# Patient Record
Sex: MALE | Race: BLACK OR AFRICAN AMERICAN | NOT HISPANIC OR LATINO | Employment: OTHER | URBAN - NONMETROPOLITAN AREA
[De-identification: names, ages, dates, MRNs, and addresses within clinical notes are randomized per-mention and may not be internally consistent; named-entity substitution may affect disease eponyms.]

---

## 2018-01-01 ENCOUNTER — APPOINTMENT (OUTPATIENT)
Dept: GENERAL RADIOLOGY | Facility: HOSPITAL | Age: 63
End: 2018-01-01

## 2018-01-01 ENCOUNTER — APPOINTMENT (OUTPATIENT)
Dept: ULTRASOUND IMAGING | Facility: HOSPITAL | Age: 63
End: 2018-01-01

## 2018-01-01 ENCOUNTER — OUTSIDE FACILITY SERVICE (OUTPATIENT)
Dept: PULMONOLOGY | Facility: CLINIC | Age: 63
End: 2018-01-01

## 2018-01-01 ENCOUNTER — APPOINTMENT (OUTPATIENT)
Dept: INTERVENTIONAL RADIOLOGY/VASCULAR | Facility: HOSPITAL | Age: 63
End: 2018-01-01

## 2018-01-01 ENCOUNTER — APPOINTMENT (OUTPATIENT)
Dept: CT IMAGING | Facility: HOSPITAL | Age: 63
End: 2018-01-01

## 2018-01-01 ENCOUNTER — HOSPITAL ENCOUNTER (OUTPATIENT)
Facility: HOSPITAL | Age: 63
End: 2018-01-20
Attending: INTERNAL MEDICINE | Admitting: INTERNAL MEDICINE

## 2018-01-01 VITALS — WEIGHT: 206.8 LBS

## 2018-01-01 LAB
25(OH)D3 SERPL-MCNC: <12.8 NG/ML (ref 30–100)
ABO + RH BLD: NORMAL
ABO + RH BLD: NORMAL
ABO GROUP BLD: NORMAL
ADV 40+41 DNA STL QL NAA+NON-PROBE: NOT DETECTED
ALBUMIN SERPL-MCNC: 2.7 G/DL (ref 3.4–4.8)
ALBUMIN SERPL-MCNC: 2.8 G/DL (ref 3.4–4.8)
ALBUMIN SERPL-MCNC: 2.8 G/DL (ref 3.4–4.8)
ALBUMIN SERPL-MCNC: 2.9 G/DL (ref 3.4–4.8)
ALBUMIN SERPL-MCNC: 3 G/DL (ref 3.4–4.8)
ALBUMIN SERPL-MCNC: 3.1 G/DL (ref 3.4–4.8)
ALBUMIN SERPL-MCNC: 3.2 G/DL (ref 3.4–4.8)
ALBUMIN/GLOB SERPL: 0.7 G/DL (ref 1.1–1.8)
ALBUMIN/GLOB SERPL: 0.8 G/DL (ref 1.1–1.8)
ALP SERPL-CCNC: 122 U/L (ref 38–126)
ALP SERPL-CCNC: 128 U/L (ref 38–126)
ALP SERPL-CCNC: 137 U/L (ref 38–126)
ALP SERPL-CCNC: 141 U/L (ref 38–126)
ALP SERPL-CCNC: 142 U/L (ref 38–126)
ALP SERPL-CCNC: 186 U/L (ref 38–126)
ALP SERPL-CCNC: 213 U/L (ref 38–126)
ALP SERPL-CCNC: 227 U/L (ref 38–126)
ALP SERPL-CCNC: 251 U/L (ref 38–126)
ALP SERPL-CCNC: 268 U/L (ref 38–126)
ALP SERPL-CCNC: 283 U/L (ref 38–126)
ALP SERPL-CCNC: 293 U/L (ref 38–126)
ALP SERPL-CCNC: 300 U/L (ref 38–126)
ALP SERPL-CCNC: 310 U/L (ref 38–126)
ALP SERPL-CCNC: 370 U/L (ref 38–126)
ALP SERPL-CCNC: 373 U/L (ref 38–126)
ALP SERPL-CCNC: 376 U/L (ref 38–126)
ALT SERPL W P-5'-P-CCNC: 34 U/L (ref 21–72)
ALT SERPL W P-5'-P-CCNC: 37 U/L (ref 21–72)
ALT SERPL W P-5'-P-CCNC: 40 U/L (ref 21–72)
ALT SERPL W P-5'-P-CCNC: 41 U/L (ref 21–72)
ALT SERPL W P-5'-P-CCNC: 43 U/L (ref 21–72)
ALT SERPL W P-5'-P-CCNC: 44 U/L (ref 21–72)
ALT SERPL W P-5'-P-CCNC: 44 U/L (ref 21–72)
ALT SERPL W P-5'-P-CCNC: 45 U/L (ref 21–72)
ALT SERPL W P-5'-P-CCNC: 48 U/L (ref 21–72)
ALT SERPL W P-5'-P-CCNC: 53 U/L (ref 21–72)
ALT SERPL W P-5'-P-CCNC: 53 U/L (ref 21–72)
ALT SERPL W P-5'-P-CCNC: 58 U/L (ref 21–72)
ALT SERPL W P-5'-P-CCNC: 60 U/L (ref 21–72)
ALT SERPL W P-5'-P-CCNC: 61 U/L (ref 21–72)
ALT SERPL W P-5'-P-CCNC: 62 U/L (ref 21–72)
ALT SERPL W P-5'-P-CCNC: 63 U/L (ref 21–72)
ALT SERPL W P-5'-P-CCNC: 71 U/L (ref 21–72)
ANION GAP SERPL CALCULATED.3IONS-SCNC: 12 MMOL/L (ref 5–15)
ANION GAP SERPL CALCULATED.3IONS-SCNC: 13 MMOL/L (ref 5–15)
ANION GAP SERPL CALCULATED.3IONS-SCNC: 14 MMOL/L (ref 5–15)
ANION GAP SERPL CALCULATED.3IONS-SCNC: 15 MMOL/L (ref 5–15)
ANION GAP SERPL CALCULATED.3IONS-SCNC: 17 MMOL/L (ref 5–15)
ANION GAP SERPL CALCULATED.3IONS-SCNC: 17 MMOL/L (ref 5–15)
ANION GAP SERPL CALCULATED.3IONS-SCNC: 24 MMOL/L (ref 5–15)
ANION GAP SERPL CALCULATED.3IONS-SCNC: 9 MMOL/L (ref 5–15)
ARTERIAL PATENCY WRIST A: ABNORMAL
AST SERPL-CCNC: 100 U/L (ref 17–59)
AST SERPL-CCNC: 25 U/L (ref 17–59)
AST SERPL-CCNC: 31 U/L (ref 17–59)
AST SERPL-CCNC: 35 U/L (ref 17–59)
AST SERPL-CCNC: 36 U/L (ref 17–59)
AST SERPL-CCNC: 38 U/L (ref 17–59)
AST SERPL-CCNC: 39 U/L (ref 17–59)
AST SERPL-CCNC: 43 U/L (ref 17–59)
AST SERPL-CCNC: 47 U/L (ref 17–59)
AST SERPL-CCNC: 48 U/L (ref 17–59)
AST SERPL-CCNC: 49 U/L (ref 17–59)
AST SERPL-CCNC: 57 U/L (ref 17–59)
AST SERPL-CCNC: 60 U/L (ref 17–59)
AST SERPL-CCNC: 61 U/L (ref 17–59)
AST SERPL-CCNC: 64 U/L (ref 17–59)
AST SERPL-CCNC: 74 U/L (ref 17–59)
AST SERPL-CCNC: 98 U/L (ref 17–59)
ASTRO TYP 1-8 RNA STL QL NAA+NON-PROBE: NOT DETECTED
ATMOSPHERIC PRESS: ABNORMAL MMHG
BACTERIA BLD CULT: ABNORMAL
BACTERIA BLD CULT: ABNORMAL
BACTERIA SPEC AEROBE CULT: ABNORMAL
BACTERIA SPEC AEROBE CULT: NORMAL
BACTERIA SPEC RESP CULT: ABNORMAL
BACTERIA SPEC RESP CULT: NORMAL
BACTERIA SPEC RESP CULT: NORMAL
BACTERIA UR QL AUTO: ABNORMAL /HPF
BASE EXCESS BLDA CALC-SCNC: -0.1 MMOL/L (ref -2.4–2.4)
BASE EXCESS BLDA CALC-SCNC: -0.9 MMOL/L (ref -2.4–2.4)
BASE EXCESS BLDA CALC-SCNC: -10.3 MMOL/L (ref -2.4–2.4)
BASE EXCESS BLDA CALC-SCNC: -11.8 MMOL/L (ref -2.4–2.4)
BASE EXCESS BLDA CALC-SCNC: -12.9 MMOL/L (ref -2.4–2.4)
BASE EXCESS BLDA CALC-SCNC: -15.2 MMOL/L (ref -2.4–2.4)
BASE EXCESS BLDA CALC-SCNC: -3.2 MMOL/L (ref -2.4–2.4)
BASE EXCESS BLDA CALC-SCNC: -3.3 MMOL/L (ref -2.4–2.4)
BASE EXCESS BLDA CALC-SCNC: -4.6 MMOL/L (ref -2.4–2.4)
BASE EXCESS BLDA CALC-SCNC: -4.7 MMOL/L (ref -2.4–2.4)
BASE EXCESS BLDA CALC-SCNC: -7.6 MMOL/L (ref -2.4–2.4)
BASE EXCESS BLDA CALC-SCNC: -7.6 MMOL/L (ref -2.4–2.4)
BASE EXCESS BLDA CALC-SCNC: -8.1 MMOL/L (ref -2.4–2.4)
BASE EXCESS BLDA CALC-SCNC: -8.8 MMOL/L (ref -2.4–2.4)
BASE EXCESS BLDA CALC-SCNC: -9 MMOL/L (ref -2.4–2.4)
BASE EXCESS BLDA CALC-SCNC: 0.3 MMOL/L (ref -2.4–2.4)
BASE EXCESS BLDA CALC-SCNC: 0.7 MMOL/L (ref -2.4–2.4)
BASE EXCESS BLDA CALC-SCNC: 0.8 MMOL/L (ref -2.4–2.4)
BASOPHILS # BLD AUTO: 0.06 10*3/MM3 (ref 0–0.2)
BASOPHILS NFR BLD AUTO: 0.4 % (ref 0–2)
BDY SITE: ABNORMAL
BH BB BLOOD EXPIRATION DATE: NORMAL
BH BB BLOOD EXPIRATION DATE: NORMAL
BH BB BLOOD TYPE BARCODE: 7300
BH BB BLOOD TYPE BARCODE: 7300
BH BB DISPENSE STATUS: NORMAL
BH BB DISPENSE STATUS: NORMAL
BH BB PRODUCT CODE: NORMAL
BH BB PRODUCT CODE: NORMAL
BH BB UNIT NUMBER: NORMAL
BH BB UNIT NUMBER: NORMAL
BILIRUB SERPL-MCNC: 0.4 MG/DL (ref 0.2–1.3)
BILIRUB SERPL-MCNC: 0.5 MG/DL (ref 0.2–1.3)
BILIRUB SERPL-MCNC: 0.6 MG/DL (ref 0.2–1.3)
BILIRUB SERPL-MCNC: 0.7 MG/DL (ref 0.2–1.3)
BILIRUB SERPL-MCNC: 0.8 MG/DL (ref 0.2–1.3)
BILIRUB SERPL-MCNC: 0.8 MG/DL (ref 0.2–1.3)
BILIRUB SERPL-MCNC: 0.9 MG/DL (ref 0.2–1.3)
BILIRUB SERPL-MCNC: 1.1 MG/DL (ref 0.2–1.3)
BILIRUB SERPL-MCNC: 1.8 MG/DL (ref 0.2–1.3)
BILIRUB UR QL STRIP: NEGATIVE
BLD GP AB SCN SERPL QL: NEGATIVE
BUN BLD-MCNC: 55 MG/DL (ref 7–21)
BUN BLD-MCNC: 58 MG/DL (ref 7–21)
BUN BLD-MCNC: 60 MG/DL (ref 7–21)
BUN BLD-MCNC: 62 MG/DL (ref 7–21)
BUN BLD-MCNC: 63 MG/DL (ref 7–21)
BUN BLD-MCNC: 66 MG/DL (ref 7–21)
BUN BLD-MCNC: 69 MG/DL (ref 7–21)
BUN BLD-MCNC: 71 MG/DL (ref 7–21)
BUN BLD-MCNC: 73 MG/DL (ref 7–21)
BUN BLD-MCNC: 73 MG/DL (ref 7–21)
BUN BLD-MCNC: 77 MG/DL (ref 7–21)
BUN BLD-MCNC: 79 MG/DL (ref 7–21)
BUN BLD-MCNC: 84 MG/DL (ref 7–21)
BUN BLD-MCNC: 85 MG/DL (ref 7–21)
BUN BLD-MCNC: 88 MG/DL (ref 7–21)
BUN BLD-MCNC: 99 MG/DL (ref 7–21)
BUN/CREAT SERPL: 16.1 (ref 7–25)
BUN/CREAT SERPL: 16.4 (ref 7–25)
BUN/CREAT SERPL: 17.3 (ref 7–25)
BUN/CREAT SERPL: 17.6 (ref 7–25)
BUN/CREAT SERPL: 18.9 (ref 7–25)
BUN/CREAT SERPL: 19.2 (ref 7–25)
BUN/CREAT SERPL: 19.9 (ref 7–25)
BUN/CREAT SERPL: 19.9 (ref 7–25)
BUN/CREAT SERPL: 20.1 (ref 7–25)
BUN/CREAT SERPL: 20.3 (ref 7–25)
BUN/CREAT SERPL: 21.9 (ref 7–25)
BUN/CREAT SERPL: 22.2 (ref 7–25)
BUN/CREAT SERPL: 22.3 (ref 7–25)
BUN/CREAT SERPL: 22.7 (ref 7–25)
BUN/CREAT SERPL: 23 (ref 7–25)
BUN/CREAT SERPL: 23.3 (ref 7–25)
BUN/CREAT SERPL: 23.8 (ref 7–25)
BUN/CREAT SERPL: 25.2 (ref 7–25)
C CAYETANENSIS DNA STL QL NAA+NON-PROBE: NOT DETECTED
C DIFF TOX GENS STL QL NAA+PROBE: NEGATIVE
C DIFF TOX GENS STL QL NAA+PROBE: NORMAL
CA-I BLD-MCNC: 4.3 MG/DL (ref 4.5–4.9)
CA-I BLD-MCNC: 4.4 MG/DL (ref 4.5–4.9)
CA-I BLD-MCNC: 4.4 MG/DL (ref 4.5–4.9)
CA-I BLD-MCNC: 4.5 MG/DL (ref 4.5–4.9)
CA-I BLD-MCNC: 4.6 MG/DL (ref 4.5–4.9)
CA-I BLD-MCNC: 4.6 MG/DL (ref 4.5–4.9)
CA-I BLD-MCNC: 4.7 MG/DL (ref 4.5–4.9)
CA-I BLD-MCNC: 4.8 MG/DL (ref 4.5–4.9)
CA-I BLD-MCNC: 4.9 MG/DL (ref 4.5–4.9)
CA-I BLD-MCNC: 4.9 MG/DL (ref 4.5–4.9)
CA-I BLD-MCNC: 5 MG/DL (ref 4.5–4.9)
CALCIUM SPEC-SCNC: 8 MG/DL (ref 8.4–10.2)
CALCIUM SPEC-SCNC: 8.1 MG/DL (ref 8.4–10.2)
CALCIUM SPEC-SCNC: 8.2 MG/DL (ref 8.4–10.2)
CALCIUM SPEC-SCNC: 8.3 MG/DL (ref 8.4–10.2)
CALCIUM SPEC-SCNC: 8.4 MG/DL (ref 8.4–10.2)
CALCIUM SPEC-SCNC: 8.5 MG/DL (ref 8.4–10.2)
CALCIUM SPEC-SCNC: 8.6 MG/DL (ref 8.4–10.2)
CALCIUM SPEC-SCNC: 8.6 MG/DL (ref 8.4–10.2)
CALCIUM SPEC-SCNC: 8.9 MG/DL (ref 8.4–10.2)
CALCIUM SPEC-SCNC: 9 MG/DL (ref 8.4–10.2)
CALCIUM SPEC-SCNC: 9 MG/DL (ref 8.4–10.2)
CAMPY SP DNA.DIARRHEA STL QL NAA+PROBE: NOT DETECTED
CHLORIDE SERPL-SCNC: 103 MMOL/L (ref 95–110)
CHLORIDE SERPL-SCNC: 105 MMOL/L (ref 95–110)
CHLORIDE SERPL-SCNC: 105 MMOL/L (ref 95–110)
CHLORIDE SERPL-SCNC: 106 MMOL/L (ref 95–110)
CHLORIDE SERPL-SCNC: 107 MMOL/L (ref 95–110)
CHLORIDE SERPL-SCNC: 108 MMOL/L (ref 95–110)
CHLORIDE SERPL-SCNC: 108 MMOL/L (ref 95–110)
CHLORIDE SERPL-SCNC: 109 MMOL/L (ref 95–110)
CHLORIDE SERPL-SCNC: 110 MMOL/L (ref 95–110)
CHLORIDE SERPL-SCNC: 111 MMOL/L (ref 95–110)
CHLORIDE SERPL-SCNC: 112 MMOL/L (ref 95–110)
CHLORIDE SERPL-SCNC: 112 MMOL/L (ref 95–110)
CHLORIDE SERPL-SCNC: 116 MMOL/L (ref 95–110)
CHLORIDE SERPL-SCNC: 117 MMOL/L (ref 95–110)
CHLORIDE SERPL-SCNC: 119 MMOL/L (ref 95–110)
CHLORIDE SERPL-SCNC: 119 MMOL/L (ref 95–110)
CLARITY UR: ABNORMAL
CO2 BLDA-SCNC: 11.3 MMOL/L (ref 23–27)
CO2 BLDA-SCNC: 11.9 MMOL/L (ref 23–27)
CO2 BLDA-SCNC: 13.4 MMOL/L (ref 23–27)
CO2 BLDA-SCNC: 14.6 MMOL/L (ref 23–27)
CO2 BLDA-SCNC: 15.6 MMOL/L (ref 23–27)
CO2 BLDA-SCNC: 16 MMOL/L (ref 23–27)
CO2 BLDA-SCNC: 16.2 MMOL/L (ref 23–27)
CO2 BLDA-SCNC: 17.2 MMOL/L (ref 23–27)
CO2 BLDA-SCNC: 17.5 MMOL/L (ref 23–27)
CO2 BLDA-SCNC: 19.3 MMOL/L (ref 23–27)
CO2 BLDA-SCNC: 19.3 MMOL/L (ref 23–27)
CO2 BLDA-SCNC: 20.9 MMOL/L (ref 23–27)
CO2 BLDA-SCNC: 21.7 MMOL/L (ref 23–27)
CO2 BLDA-SCNC: 23.8 MMOL/L (ref 23–27)
CO2 BLDA-SCNC: 24.2 MMOL/L (ref 23–27)
CO2 BLDA-SCNC: 24.5 MMOL/L (ref 23–27)
CO2 BLDA-SCNC: 24.6 MMOL/L (ref 23–27)
CO2 BLDA-SCNC: 24.8 MMOL/L (ref 23–27)
CO2 SERPL-SCNC: 11 MMOL/L (ref 22–31)
CO2 SERPL-SCNC: 12 MMOL/L (ref 22–31)
CO2 SERPL-SCNC: 14 MMOL/L (ref 22–31)
CO2 SERPL-SCNC: 16 MMOL/L (ref 22–31)
CO2 SERPL-SCNC: 17 MMOL/L (ref 22–31)
CO2 SERPL-SCNC: 18 MMOL/L (ref 22–31)
CO2 SERPL-SCNC: 18 MMOL/L (ref 22–31)
CO2 SERPL-SCNC: 20 MMOL/L (ref 22–31)
CO2 SERPL-SCNC: 21 MMOL/L (ref 22–31)
CO2 SERPL-SCNC: 22 MMOL/L (ref 22–31)
CO2 SERPL-SCNC: 23 MMOL/L (ref 22–31)
CO2 SERPL-SCNC: 23 MMOL/L (ref 22–31)
CO2 SERPL-SCNC: 24 MMOL/L (ref 22–31)
CO2 SERPL-SCNC: 25 MMOL/L (ref 22–31)
COLOR UR: YELLOW
CREAT BLD-MCNC: 3.07 MG/DL (ref 0.7–1.3)
CREAT BLD-MCNC: 3.09 MG/DL (ref 0.7–1.3)
CREAT BLD-MCNC: 3.1 MG/DL (ref 0.7–1.3)
CREAT BLD-MCNC: 3.13 MG/DL (ref 0.7–1.3)
CREAT BLD-MCNC: 3.14 MG/DL (ref 0.7–1.3)
CREAT BLD-MCNC: 3.18 MG/DL (ref 0.7–1.3)
CREAT BLD-MCNC: 3.2 MG/DL (ref 0.7–1.3)
CREAT BLD-MCNC: 3.21 MG/DL (ref 0.7–1.3)
CREAT BLD-MCNC: 3.29 MG/DL (ref 0.7–1.3)
CREAT BLD-MCNC: 3.32 MG/DL (ref 0.7–1.3)
CREAT BLD-MCNC: 3.35 MG/DL (ref 0.7–1.3)
CREAT BLD-MCNC: 3.41 MG/DL (ref 0.7–1.3)
CREAT BLD-MCNC: 3.45 MG/DL (ref 0.7–1.3)
CREAT BLD-MCNC: 3.47 MG/DL (ref 0.7–1.3)
CREAT BLD-MCNC: 3.6 MG/DL (ref 0.7–1.3)
CREAT BLD-MCNC: 3.7 MG/DL (ref 0.7–1.3)
CREAT BLD-MCNC: 4.53 MG/DL (ref 0.7–1.3)
CREAT BLD-MCNC: 4.65 MG/DL (ref 0.7–1.3)
CRYPTOSP STL CULT: NOT DETECTED
D-LACTATE SERPL-SCNC: 0.9 MMOL/L (ref 0.5–2)
DEPRECATED RDW RBC AUTO: 45.3 FL (ref 35.1–43.9)
DEPRECATED RDW RBC AUTO: 46.1 FL (ref 35.1–43.9)
DEPRECATED RDW RBC AUTO: 46.4 FL (ref 35.1–43.9)
DEPRECATED RDW RBC AUTO: 46.5 FL (ref 35.1–43.9)
DEPRECATED RDW RBC AUTO: 46.6 FL (ref 35.1–43.9)
DEPRECATED RDW RBC AUTO: 47.3 FL (ref 35.1–43.9)
DEPRECATED RDW RBC AUTO: 48 FL (ref 35.1–43.9)
DEPRECATED RDW RBC AUTO: 48.1 FL (ref 35.1–43.9)
DEPRECATED RDW RBC AUTO: 48.6 FL (ref 35.1–43.9)
DEPRECATED RDW RBC AUTO: 49.3 FL (ref 35.1–43.9)
DEPRECATED RDW RBC AUTO: 49.4 FL (ref 35.1–43.9)
DEPRECATED RDW RBC AUTO: 49.4 FL (ref 35.1–43.9)
DEPRECATED RDW RBC AUTO: 49.5 FL (ref 35.1–43.9)
DEPRECATED RDW RBC AUTO: 49.8 FL (ref 35.1–43.9)
DEPRECATED RDW RBC AUTO: 49.8 FL (ref 35.1–43.9)
DEPRECATED RDW RBC AUTO: 50 FL (ref 35.1–43.9)
DEPRECATED RDW RBC AUTO: 50.1 FL (ref 35.1–43.9)
DEPRECATED RDW RBC AUTO: 52.1 FL (ref 35.1–43.9)
E COLI DNA SPEC QL NAA+PROBE: NOT DETECTED
E HISTOLYT AG STL-ACNC: NOT DETECTED
EAEC PAA PLAS AGGR+AATA ST NAA+NON-PRB: NOT DETECTED
EC STX1+STX2 GENES STL QL NAA+NON-PROBE: NOT DETECTED
EOSINOPHIL # BLD AUTO: 0.71 10*3/MM3 (ref 0–0.7)
EOSINOPHIL NFR BLD AUTO: 4.4 % (ref 0–7)
EPEC EAE GENE STL QL NAA+NON-PROBE: NOT DETECTED
ERYTHROCYTE [DISTWIDTH] IN BLOOD BY AUTOMATED COUNT: 15.6 % (ref 11.5–14.5)
ERYTHROCYTE [DISTWIDTH] IN BLOOD BY AUTOMATED COUNT: 15.7 % (ref 11.5–14.5)
ERYTHROCYTE [DISTWIDTH] IN BLOOD BY AUTOMATED COUNT: 15.9 % (ref 11.5–14.5)
ERYTHROCYTE [DISTWIDTH] IN BLOOD BY AUTOMATED COUNT: 16.1 % (ref 11.5–14.5)
ERYTHROCYTE [DISTWIDTH] IN BLOOD BY AUTOMATED COUNT: 16.3 % (ref 11.5–14.5)
ERYTHROCYTE [DISTWIDTH] IN BLOOD BY AUTOMATED COUNT: 16.3 % (ref 11.5–14.5)
ERYTHROCYTE [DISTWIDTH] IN BLOOD BY AUTOMATED COUNT: 16.4 % (ref 11.5–14.5)
ERYTHROCYTE [DISTWIDTH] IN BLOOD BY AUTOMATED COUNT: 16.6 % (ref 11.5–14.5)
ERYTHROCYTE [DISTWIDTH] IN BLOOD BY AUTOMATED COUNT: 16.7 % (ref 11.5–14.5)
ERYTHROCYTE [DISTWIDTH] IN BLOOD BY AUTOMATED COUNT: 16.7 % (ref 11.5–14.5)
ERYTHROCYTE [DISTWIDTH] IN BLOOD BY AUTOMATED COUNT: 16.8 % (ref 11.5–14.5)
ERYTHROCYTE [DISTWIDTH] IN BLOOD BY AUTOMATED COUNT: 16.9 % (ref 11.5–14.5)
ERYTHROCYTE [DISTWIDTH] IN BLOOD BY AUTOMATED COUNT: 17 % (ref 11.5–14.5)
ERYTHROCYTE [DISTWIDTH] IN BLOOD BY AUTOMATED COUNT: 17 % (ref 11.5–14.5)
ETEC LTA+ST1A+ST1B TOX ST NAA+NON-PROBE: NOT DETECTED
FERRITIN SERPL-MCNC: 544 NG/ML (ref 17.9–464)
FLUAV AG NPH QL: NEGATIVE
FLUBV AG NPH QL IA: NEGATIVE
G LAMBLIA DNA SPEC QL NAA+PROBE: NOT DETECTED
GFR SERPL CREATININE-BSD FRML MDRD: 16 ML/MIN/1.73 (ref 49–113)
GFR SERPL CREATININE-BSD FRML MDRD: 16 ML/MIN/1.73 (ref 49–113)
GFR SERPL CREATININE-BSD FRML MDRD: 20 ML/MIN/1.73 (ref 49–113)
GFR SERPL CREATININE-BSD FRML MDRD: 21 ML/MIN/1.73 (ref 49–113)
GFR SERPL CREATININE-BSD FRML MDRD: 22 ML/MIN/1.73 (ref 49–113)
GFR SERPL CREATININE-BSD FRML MDRD: 23 ML/MIN/1.73 (ref 49–113)
GFR SERPL CREATININE-BSD FRML MDRD: 24 ML/MIN/1.73 (ref 49–113)
GFR SERPL CREATININE-BSD FRML MDRD: 25 ML/MIN/1.73 (ref 49–113)
GLOBULIN UR ELPH-MCNC: 3.5 GM/DL (ref 2.3–3.5)
GLOBULIN UR ELPH-MCNC: 3.7 GM/DL (ref 2.3–3.5)
GLOBULIN UR ELPH-MCNC: 3.8 GM/DL (ref 2.3–3.5)
GLOBULIN UR ELPH-MCNC: 3.9 GM/DL (ref 2.3–3.5)
GLOBULIN UR ELPH-MCNC: 3.9 GM/DL (ref 2.3–3.5)
GLOBULIN UR ELPH-MCNC: 4 GM/DL (ref 2.3–3.5)
GLOBULIN UR ELPH-MCNC: 4.1 GM/DL (ref 2.3–3.5)
GLOBULIN UR ELPH-MCNC: 4.2 GM/DL (ref 2.3–3.5)
GLOBULIN UR ELPH-MCNC: 4.3 GM/DL (ref 2.3–3.5)
GLOBULIN UR ELPH-MCNC: 4.3 GM/DL (ref 2.3–3.5)
GLOBULIN UR ELPH-MCNC: 4.5 GM/DL (ref 2.3–3.5)
GLUCOSE BLD-MCNC: 113 MG/DL (ref 60–100)
GLUCOSE BLD-MCNC: 116 MG/DL (ref 60–100)
GLUCOSE BLD-MCNC: 119 MG/DL (ref 60–100)
GLUCOSE BLD-MCNC: 121 MG/DL (ref 60–100)
GLUCOSE BLD-MCNC: 129 MG/DL (ref 60–100)
GLUCOSE BLD-MCNC: 130 MG/DL (ref 60–100)
GLUCOSE BLD-MCNC: 137 MG/DL (ref 60–100)
GLUCOSE BLD-MCNC: 140 MG/DL (ref 60–100)
GLUCOSE BLD-MCNC: 144 MG/DL (ref 60–100)
GLUCOSE BLD-MCNC: 148 MG/DL (ref 60–100)
GLUCOSE BLD-MCNC: 156 MG/DL (ref 60–100)
GLUCOSE BLD-MCNC: 156 MG/DL (ref 60–100)
GLUCOSE BLD-MCNC: 159 MG/DL (ref 60–100)
GLUCOSE BLD-MCNC: 174 MG/DL (ref 60–100)
GLUCOSE BLD-MCNC: 175 MG/DL (ref 60–100)
GLUCOSE BLD-MCNC: 177 MG/DL (ref 60–100)
GLUCOSE BLD-MCNC: 181 MG/DL (ref 60–100)
GLUCOSE BLD-MCNC: 211 MG/DL (ref 60–100)
GLUCOSE BLDA-MCNC: 124 MMOL/L
GLUCOSE BLDA-MCNC: 125 MMOL/L
GLUCOSE BLDA-MCNC: 129 MMOL/L
GLUCOSE BLDA-MCNC: 130 MMOL/L
GLUCOSE BLDA-MCNC: 130 MMOL/L
GLUCOSE BLDA-MCNC: 140 MMOL/L
GLUCOSE BLDA-MCNC: 141 MMOL/L
GLUCOSE BLDA-MCNC: 142 MMOL/L
GLUCOSE BLDA-MCNC: 145 MMOL/L
GLUCOSE BLDA-MCNC: 145 MMOL/L
GLUCOSE BLDA-MCNC: 150 MMOL/L
GLUCOSE BLDA-MCNC: 151 MMOL/L
GLUCOSE BLDA-MCNC: 160 MMOL/L
GLUCOSE BLDA-MCNC: 164 MMOL/L
GLUCOSE BLDA-MCNC: 169 MMOL/L
GLUCOSE BLDA-MCNC: 184 MMOL/L
GLUCOSE BLDA-MCNC: 194 MMOL/L
GLUCOSE BLDA-MCNC: 199 MMOL/L
GLUCOSE BLDC GLUCOMTR-MCNC: 108 MG/DL (ref 70–130)
GLUCOSE BLDC GLUCOMTR-MCNC: 118 MG/DL (ref 70–130)
GLUCOSE BLDC GLUCOMTR-MCNC: 119 MG/DL (ref 70–130)
GLUCOSE BLDC GLUCOMTR-MCNC: 123 MG/DL (ref 70–130)
GLUCOSE BLDC GLUCOMTR-MCNC: 124 MG/DL (ref 70–130)
GLUCOSE BLDC GLUCOMTR-MCNC: 124 MG/DL (ref 70–130)
GLUCOSE BLDC GLUCOMTR-MCNC: 130 MG/DL (ref 70–130)
GLUCOSE BLDC GLUCOMTR-MCNC: 131 MG/DL (ref 70–130)
GLUCOSE BLDC GLUCOMTR-MCNC: 131 MG/DL (ref 70–130)
GLUCOSE BLDC GLUCOMTR-MCNC: 132 MG/DL (ref 70–130)
GLUCOSE BLDC GLUCOMTR-MCNC: 135 MG/DL (ref 70–130)
GLUCOSE BLDC GLUCOMTR-MCNC: 136 MG/DL (ref 70–130)
GLUCOSE BLDC GLUCOMTR-MCNC: 137 MG/DL (ref 70–130)
GLUCOSE BLDC GLUCOMTR-MCNC: 137 MG/DL (ref 70–130)
GLUCOSE BLDC GLUCOMTR-MCNC: 141 MG/DL (ref 70–130)
GLUCOSE BLDC GLUCOMTR-MCNC: 142 MG/DL (ref 70–130)
GLUCOSE BLDC GLUCOMTR-MCNC: 143 MG/DL (ref 70–130)
GLUCOSE BLDC GLUCOMTR-MCNC: 145 MG/DL (ref 70–130)
GLUCOSE BLDC GLUCOMTR-MCNC: 146 MG/DL (ref 70–130)
GLUCOSE BLDC GLUCOMTR-MCNC: 147 MG/DL (ref 70–130)
GLUCOSE BLDC GLUCOMTR-MCNC: 148 MG/DL (ref 70–130)
GLUCOSE BLDC GLUCOMTR-MCNC: 148 MG/DL (ref 70–130)
GLUCOSE BLDC GLUCOMTR-MCNC: 150 MG/DL (ref 70–130)
GLUCOSE BLDC GLUCOMTR-MCNC: 150 MG/DL (ref 70–130)
GLUCOSE BLDC GLUCOMTR-MCNC: 155 MG/DL (ref 70–130)
GLUCOSE BLDC GLUCOMTR-MCNC: 156 MG/DL (ref 70–130)
GLUCOSE BLDC GLUCOMTR-MCNC: 157 MG/DL (ref 70–130)
GLUCOSE BLDC GLUCOMTR-MCNC: 159 MG/DL (ref 70–130)
GLUCOSE BLDC GLUCOMTR-MCNC: 161 MG/DL (ref 70–130)
GLUCOSE BLDC GLUCOMTR-MCNC: 164 MG/DL (ref 70–130)
GLUCOSE BLDC GLUCOMTR-MCNC: 167 MG/DL (ref 70–130)
GLUCOSE BLDC GLUCOMTR-MCNC: 171 MG/DL (ref 70–130)
GLUCOSE BLDC GLUCOMTR-MCNC: 172 MG/DL (ref 70–130)
GLUCOSE BLDC GLUCOMTR-MCNC: 176 MG/DL (ref 70–130)
GLUCOSE BLDC GLUCOMTR-MCNC: 180 MG/DL (ref 70–130)
GLUCOSE BLDC GLUCOMTR-MCNC: 184 MG/DL (ref 70–130)
GLUCOSE BLDC GLUCOMTR-MCNC: 188 MG/DL (ref 70–130)
GLUCOSE BLDC GLUCOMTR-MCNC: 190 MG/DL (ref 70–130)
GLUCOSE BLDC GLUCOMTR-MCNC: 193 MG/DL (ref 70–130)
GLUCOSE BLDC GLUCOMTR-MCNC: 195 MG/DL (ref 70–130)
GLUCOSE BLDC GLUCOMTR-MCNC: 196 MG/DL (ref 70–130)
GLUCOSE BLDC GLUCOMTR-MCNC: 197 MG/DL (ref 70–130)
GLUCOSE BLDC GLUCOMTR-MCNC: 198 MG/DL (ref 70–130)
GLUCOSE BLDC GLUCOMTR-MCNC: 199 MG/DL (ref 70–130)
GLUCOSE BLDC GLUCOMTR-MCNC: 201 MG/DL (ref 70–130)
GLUCOSE BLDC GLUCOMTR-MCNC: 202 MG/DL (ref 70–130)
GLUCOSE BLDC GLUCOMTR-MCNC: 209 MG/DL (ref 70–130)
GLUCOSE BLDC GLUCOMTR-MCNC: 212 MG/DL (ref 70–130)
GLUCOSE BLDC GLUCOMTR-MCNC: 223 MG/DL (ref 70–130)
GLUCOSE BLDC GLUCOMTR-MCNC: 227 MG/DL (ref 70–130)
GLUCOSE BLDC GLUCOMTR-MCNC: 228 MG/DL (ref 70–130)
GLUCOSE BLDC GLUCOMTR-MCNC: 244 MG/DL (ref 70–130)
GLUCOSE UR STRIP-MCNC: NEGATIVE MG/DL
GRAM STN SPEC: ABNORMAL
GRAM STN SPEC: NORMAL
HANSEL STAIN: NEGATIVE
HCO3 BLDA-SCNC: 10.6 MMOL/L (ref 22–26)
HCO3 BLDA-SCNC: 11.3 MMOL/L (ref 22–26)
HCO3 BLDA-SCNC: 12.7 MMOL/L (ref 22–26)
HCO3 BLDA-SCNC: 13.8 MMOL/L (ref 22–26)
HCO3 BLDA-SCNC: 14.8 MMOL/L (ref 22–26)
HCO3 BLDA-SCNC: 15.1 MMOL/L (ref 22–26)
HCO3 BLDA-SCNC: 15.5 MMOL/L (ref 22–26)
HCO3 BLDA-SCNC: 16.4 MMOL/L (ref 22–26)
HCO3 BLDA-SCNC: 16.6 MMOL/L (ref 22–26)
HCO3 BLDA-SCNC: 18.4 MMOL/L (ref 22–26)
HCO3 BLDA-SCNC: 18.5 MMOL/L (ref 22–26)
HCO3 BLDA-SCNC: 20 MMOL/L (ref 22–26)
HCO3 BLDA-SCNC: 20.7 MMOL/L (ref 22–26)
HCO3 BLDA-SCNC: 22.9 MMOL/L (ref 22–26)
HCO3 BLDA-SCNC: 23.1 MMOL/L (ref 22–26)
HCO3 BLDA-SCNC: 23.5 MMOL/L (ref 22–26)
HCO3 BLDA-SCNC: 23.6 MMOL/L (ref 22–26)
HCO3 BLDA-SCNC: 23.7 MMOL/L (ref 22–26)
HCT VFR BLD AUTO: 21 % (ref 39–49)
HCT VFR BLD AUTO: 21.2 % (ref 39–49)
HCT VFR BLD AUTO: 21.4 % (ref 39–49)
HCT VFR BLD AUTO: 22.3 % (ref 39–49)
HCT VFR BLD AUTO: 22.5 % (ref 39–49)
HCT VFR BLD AUTO: 22.9 % (ref 39–49)
HCT VFR BLD AUTO: 23.4 % (ref 39–49)
HCT VFR BLD AUTO: 23.9 % (ref 39–49)
HCT VFR BLD AUTO: 24.5 % (ref 39–49)
HCT VFR BLD AUTO: 25.4 % (ref 39–49)
HCT VFR BLD AUTO: 25.4 % (ref 39–49)
HCT VFR BLD AUTO: 25.9 % (ref 39–49)
HCT VFR BLD AUTO: 26.1 % (ref 39–49)
HCT VFR BLD AUTO: 26.7 % (ref 39–49)
HCT VFR BLD AUTO: 27.6 % (ref 39–49)
HCT VFR BLD AUTO: 27.6 % (ref 39–49)
HCT VFR BLD CALC: 11 % (ref 40–54)
HCT VFR BLD CALC: 21 % (ref 40–54)
HCT VFR BLD CALC: 22 % (ref 40–54)
HCT VFR BLD CALC: 22 % (ref 40–54)
HCT VFR BLD CALC: 23 % (ref 40–54)
HCT VFR BLD CALC: 24 % (ref 40–54)
HCT VFR BLD CALC: 24 % (ref 40–54)
HCT VFR BLD CALC: 25 % (ref 40–54)
HCT VFR BLD CALC: 26 % (ref 40–54)
HCT VFR BLD CALC: 26 % (ref 40–54)
HCT VFR BLD CALC: 27 % (ref 40–54)
HCT VFR BLD CALC: 27 % (ref 40–54)
HCT VFR BLD CALC: 28 % (ref 40–54)
HCT VFR BLD CALC: 29 % (ref 40–54)
HCT VFR BLD CALC: 29 % (ref 40–54)
HGB BLD-MCNC: 7 G/DL (ref 13.7–17.3)
HGB BLD-MCNC: 7.2 G/DL (ref 13.7–17.3)
HGB BLD-MCNC: 7.2 G/DL (ref 13.7–17.3)
HGB BLD-MCNC: 7.4 G/DL (ref 13.7–17.3)
HGB BLD-MCNC: 7.7 G/DL (ref 13.7–17.3)
HGB BLD-MCNC: 7.8 G/DL (ref 13.7–17.3)
HGB BLD-MCNC: 7.8 G/DL (ref 13.7–17.3)
HGB BLD-MCNC: 8.1 G/DL (ref 13.7–17.3)
HGB BLD-MCNC: 8.2 G/DL (ref 13.7–17.3)
HGB BLD-MCNC: 8.2 G/DL (ref 13.7–17.3)
HGB BLD-MCNC: 8.3 G/DL (ref 13.7–17.3)
HGB BLD-MCNC: 8.6 G/DL (ref 13.7–17.3)
HGB BLD-MCNC: 8.7 G/DL (ref 13.7–17.3)
HGB BLD-MCNC: 8.7 G/DL (ref 13.7–17.3)
HGB BLD-MCNC: 8.8 G/DL (ref 13.7–17.3)
HGB BLD-MCNC: 9 G/DL (ref 13.7–17.3)
HGB BLD-MCNC: 9.3 G/DL (ref 13.7–17.3)
HGB BLD-MCNC: 9.3 G/DL (ref 13.7–17.3)
HGB BLDA-MCNC: 10 G/DL (ref 14–18)
HGB BLDA-MCNC: 3.6 G/DL (ref 14–18)
HGB BLDA-MCNC: 7 G/DL (ref 14–18)
HGB BLDA-MCNC: 7.4 G/DL (ref 14–18)
HGB BLDA-MCNC: 7.5 G/DL (ref 14–18)
HGB BLDA-MCNC: 7.7 G/DL (ref 14–18)
HGB BLDA-MCNC: 8.2 G/DL (ref 14–18)
HGB BLDA-MCNC: 8.3 G/DL (ref 14–18)
HGB BLDA-MCNC: 8.4 G/DL (ref 14–18)
HGB BLDA-MCNC: 8.4 G/DL (ref 14–18)
HGB BLDA-MCNC: 8.5 G/DL (ref 14–18)
HGB BLDA-MCNC: 8.6 G/DL (ref 14–18)
HGB BLDA-MCNC: 8.9 G/DL (ref 14–18)
HGB BLDA-MCNC: 9 G/DL (ref 14–18)
HGB BLDA-MCNC: 9.3 G/DL (ref 14–18)
HGB BLDA-MCNC: 9.3 G/DL (ref 14–18)
HGB BLDA-MCNC: 9.4 G/DL (ref 14–18)
HGB BLDA-MCNC: 9.9 G/DL (ref 14–18)
HGB UR QL STRIP.AUTO: ABNORMAL
HYALINE CASTS UR QL AUTO: ABNORMAL /LPF
IMM GRANULOCYTES # BLD: 0.31 10*3/MM3 (ref 0–0.02)
IMM GRANULOCYTES NFR BLD: 1.9 % (ref 0–0.5)
IRON 24H UR-MRATE: 36 MCG/DL (ref 49–181)
IRON SATN MFR SERPL: 15 % (ref 20–55)
ISOLATED FROM: ABNORMAL
ISOLATED FROM: ABNORMAL
KETONES UR QL STRIP: NEGATIVE
LACTOFERRIN STL QL LA: POSITIVE
LEUKOCYTE ESTERASE UR QL STRIP.AUTO: ABNORMAL
LYMPHOCYTES # BLD AUTO: 0.93 10*3/MM3 (ref 0.6–4.2)
LYMPHOCYTES NFR BLD AUTO: 5.8 % (ref 10–50)
Lab: NORMAL
MAGNESIUM SERPL-MCNC: 2.1 MG/DL (ref 1.6–2.3)
MAGNESIUM SERPL-MCNC: 2.1 MG/DL (ref 1.6–2.3)
MAGNESIUM SERPL-MCNC: 2.2 MG/DL (ref 1.6–2.3)
MAGNESIUM SERPL-MCNC: 2.2 MG/DL (ref 1.6–2.3)
MAGNESIUM SERPL-MCNC: 2.3 MG/DL (ref 1.6–2.3)
MAGNESIUM SERPL-MCNC: 2.3 MG/DL (ref 1.6–2.3)
MAGNESIUM SERPL-MCNC: 2.4 MG/DL (ref 1.6–2.3)
MAGNESIUM SERPL-MCNC: 2.5 MG/DL (ref 1.6–2.3)
MAGNESIUM SERPL-MCNC: 2.5 MG/DL (ref 1.6–2.3)
MAGNESIUM SERPL-MCNC: 2.6 MG/DL (ref 1.6–2.3)
MAGNESIUM SERPL-MCNC: 2.7 MG/DL (ref 1.6–2.3)
MAGNESIUM SERPL-MCNC: 2.7 MG/DL (ref 1.6–2.3)
MAGNESIUM SERPL-MCNC: 2.8 MG/DL (ref 1.6–2.3)
MCH RBC QN AUTO: 26.9 PG (ref 26.5–34)
MCH RBC QN AUTO: 27.2 PG (ref 26.5–34)
MCH RBC QN AUTO: 27.2 PG (ref 26.5–34)
MCH RBC QN AUTO: 27.3 PG (ref 26.5–34)
MCH RBC QN AUTO: 27.3 PG (ref 26.5–34)
MCH RBC QN AUTO: 27.4 PG (ref 26.5–34)
MCH RBC QN AUTO: 27.5 PG (ref 26.5–34)
MCH RBC QN AUTO: 27.5 PG (ref 26.5–34)
MCH RBC QN AUTO: 27.6 PG (ref 26.5–34)
MCH RBC QN AUTO: 27.7 PG (ref 26.5–34)
MCH RBC QN AUTO: 27.7 PG (ref 26.5–34)
MCH RBC QN AUTO: 28 PG (ref 26.5–34)
MCHC RBC AUTO-ENTMCNC: 32 G/DL (ref 31.5–36.3)
MCHC RBC AUTO-ENTMCNC: 33.1 G/DL (ref 31.5–36.3)
MCHC RBC AUTO-ENTMCNC: 33.3 G/DL (ref 31.5–36.3)
MCHC RBC AUTO-ENTMCNC: 33.5 G/DL (ref 31.5–36.3)
MCHC RBC AUTO-ENTMCNC: 33.7 G/DL (ref 31.5–36.3)
MCHC RBC AUTO-ENTMCNC: 33.9 G/DL (ref 31.5–36.3)
MCHC RBC AUTO-ENTMCNC: 33.9 G/DL (ref 31.5–36.3)
MCHC RBC AUTO-ENTMCNC: 34 G/DL (ref 31.5–36.3)
MCHC RBC AUTO-ENTMCNC: 34 G/DL (ref 31.5–36.3)
MCHC RBC AUTO-ENTMCNC: 34.1 G/DL (ref 31.5–36.3)
MCHC RBC AUTO-ENTMCNC: 34.3 G/DL (ref 31.5–36.3)
MCHC RBC AUTO-ENTMCNC: 34.3 G/DL (ref 31.5–36.3)
MCHC RBC AUTO-ENTMCNC: 34.5 G/DL (ref 31.5–36.3)
MCHC RBC AUTO-ENTMCNC: 34.6 G/DL (ref 31.5–36.3)
MCV RBC AUTO: 79.3 FL (ref 80–98)
MCV RBC AUTO: 79.9 FL (ref 80–98)
MCV RBC AUTO: 79.9 FL (ref 80–98)
MCV RBC AUTO: 80.2 FL (ref 80–98)
MCV RBC AUTO: 81.2 FL (ref 80–98)
MCV RBC AUTO: 81.4 FL (ref 80–98)
MCV RBC AUTO: 81.5 FL (ref 80–98)
MCV RBC AUTO: 81.6 FL (ref 80–98)
MCV RBC AUTO: 81.7 FL (ref 80–98)
MCV RBC AUTO: 81.7 FL (ref 80–98)
MCV RBC AUTO: 81.9 FL (ref 80–98)
MCV RBC AUTO: 81.9 FL (ref 80–98)
MCV RBC AUTO: 82.4 FL (ref 80–98)
MCV RBC AUTO: 82.5 FL (ref 80–98)
MCV RBC AUTO: 82.7 FL (ref 80–98)
MCV RBC AUTO: 84 FL (ref 80–98)
MODALITY: ABNORMAL
MONOCYTES # BLD AUTO: 0.82 10*3/MM3 (ref 0–0.9)
MONOCYTES NFR BLD AUTO: 5.1 % (ref 0–12)
NEUTROPHILS # BLD AUTO: 13.22 10*3/MM3 (ref 2–8.6)
NEUTROPHILS NFR BLD AUTO: 82.4 % (ref 37–80)
NITRITE UR QL STRIP: NEGATIVE
NOROVIRUS GI+II RNA STL QL NAA+NON-PROBE: NOT DETECTED
P SHIGELLOIDES DNA STL QL NAA+NON-PROBE: NOT DETECTED
PCO2 BLDA: 20.9 MM HG (ref 35–45)
PCO2 BLDA: 23.8 MM HG (ref 35–45)
PCO2 BLDA: 24.6 MM HG (ref 35–45)
PCO2 BLDA: 24.8 MM HG (ref 35–45)
PCO2 BLDA: 25.1 MM HG (ref 35–45)
PCO2 BLDA: 25.4 MM HG (ref 35–45)
PCO2 BLDA: 26.9 MM HG (ref 35–45)
PCO2 BLDA: 26.9 MM HG (ref 35–45)
PCO2 BLDA: 27.4 MM HG (ref 35–45)
PCO2 BLDA: 28.2 MM HG (ref 35–45)
PCO2 BLDA: 28.7 MM HG (ref 35–45)
PCO2 BLDA: 29.1 MM HG (ref 35–45)
PCO2 BLDA: 29.2 MM HG (ref 35–45)
PCO2 BLDA: 29.7 MM HG (ref 35–45)
PCO2 BLDA: 31.5 MM HG (ref 35–45)
PCO2 BLDA: 32.8 MM HG (ref 35–45)
PCO2 BLDA: 34.9 MM HG (ref 35–45)
PCO2 BLDA: 35 MM HG (ref 35–45)
PH BLDA: 7.26 PH UNITS (ref 7.35–7.45)
PH BLDA: 7.33 PH UNITS (ref 7.35–7.45)
PH BLDA: 7.35 PH UNITS (ref 7.35–7.45)
PH BLDA: 7.36 PH UNITS (ref 7.35–7.45)
PH BLDA: 7.37 PH UNITS (ref 7.35–7.45)
PH BLDA: 7.38 PH UNITS (ref 7.35–7.45)
PH BLDA: 7.38 PH UNITS (ref 7.35–7.45)
PH BLDA: 7.39 PH UNITS (ref 7.35–7.45)
PH BLDA: 7.4 PH UNITS (ref 7.35–7.45)
PH BLDA: 7.42 PH UNITS (ref 7.35–7.45)
PH BLDA: 7.44 PH UNITS (ref 7.35–7.45)
PH BLDA: 7.45 PH UNITS (ref 7.35–7.45)
PH BLDA: 7.46 PH UNITS (ref 7.35–7.45)
PH BLDA: 7.49 PH UNITS (ref 7.35–7.45)
PH BLDA: 7.51 PH UNITS (ref 7.35–7.45)
PH BLDA: 7.52 PH UNITS (ref 7.35–7.45)
PH UR STRIP.AUTO: <=5 [PH] (ref 5–9)
PLATELET # BLD AUTO: 145 10*3/MM3 (ref 150–450)
PLATELET # BLD AUTO: 155 10*3/MM3 (ref 150–450)
PLATELET # BLD AUTO: 160 10*3/MM3 (ref 150–450)
PLATELET # BLD AUTO: 164 10*3/MM3 (ref 150–450)
PLATELET # BLD AUTO: 164 10*3/MM3 (ref 150–450)
PLATELET # BLD AUTO: 168 10*3/MM3 (ref 150–450)
PLATELET # BLD AUTO: 169 10*3/MM3 (ref 150–450)
PLATELET # BLD AUTO: 176 10*3/MM3 (ref 150–450)
PLATELET # BLD AUTO: 178 10*3/MM3 (ref 150–450)
PLATELET # BLD AUTO: 179 10*3/MM3 (ref 150–450)
PLATELET # BLD AUTO: 181 10*3/MM3 (ref 150–450)
PLATELET # BLD AUTO: 182 10*3/MM3 (ref 150–450)
PLATELET # BLD AUTO: 196 10*3/MM3 (ref 150–450)
PLATELET # BLD AUTO: 200 10*3/MM3 (ref 150–450)
PLATELET # BLD AUTO: 207 10*3/MM3 (ref 150–450)
PLATELET # BLD AUTO: 217 10*3/MM3 (ref 150–450)
PLATELET # BLD AUTO: 223 10*3/MM3 (ref 150–450)
PLATELET # BLD AUTO: 250 10*3/MM3 (ref 150–450)
PMV BLD AUTO: 8.5 FL (ref 8–12)
PMV BLD AUTO: 8.5 FL (ref 8–12)
PMV BLD AUTO: 8.6 FL (ref 8–12)
PMV BLD AUTO: 8.6 FL (ref 8–12)
PMV BLD AUTO: 8.7 FL (ref 8–12)
PMV BLD AUTO: 8.7 FL (ref 8–12)
PMV BLD AUTO: 8.8 FL (ref 8–12)
PMV BLD AUTO: 8.8 FL (ref 8–12)
PMV BLD AUTO: 9 FL (ref 8–12)
PMV BLD AUTO: 9.1 FL (ref 8–12)
PMV BLD AUTO: 9.2 FL (ref 8–12)
PMV BLD AUTO: 9.3 FL (ref 8–12)
PMV BLD AUTO: 9.4 FL (ref 8–12)
PMV BLD AUTO: 9.4 FL (ref 8–12)
PO2 BLDA: 101.4 MM HG (ref 80–105)
PO2 BLDA: 101.7 MM HG (ref 80–105)
PO2 BLDA: 115.2 MM HG (ref 80–105)
PO2 BLDA: 123.5 MM HG (ref 80–105)
PO2 BLDA: 134.5 MM HG (ref 80–105)
PO2 BLDA: 146.8 MM HG (ref 80–105)
PO2 BLDA: 55.3 MM HG (ref 80–105)
PO2 BLDA: 68.1 MM HG (ref 80–105)
PO2 BLDA: 68.9 MM HG (ref 80–105)
PO2 BLDA: 72 MM HG (ref 80–105)
PO2 BLDA: 72.5 MM HG (ref 80–105)
PO2 BLDA: 76.4 MM HG (ref 80–105)
PO2 BLDA: 77.1 MM HG (ref 80–105)
PO2 BLDA: 80.9 MM HG (ref 80–105)
PO2 BLDA: 84.6 MM HG (ref 80–105)
PO2 BLDA: 89.1 MM HG (ref 80–105)
PO2 BLDA: 92.5 MM HG (ref 80–105)
PO2 BLDA: 97.7 MM HG (ref 80–105)
POTASSIUM BLD-SCNC: 3.2 MMOL/L (ref 3.5–5.1)
POTASSIUM BLD-SCNC: 3.3 MMOL/L (ref 3.5–5.1)
POTASSIUM BLD-SCNC: 3.4 MMOL/L (ref 3.5–5.1)
POTASSIUM BLD-SCNC: 3.5 MMOL/L (ref 3.5–5.1)
POTASSIUM BLD-SCNC: 3.6 MMOL/L (ref 3.5–5.1)
POTASSIUM BLD-SCNC: 3.8 MMOL/L (ref 3.5–5.1)
POTASSIUM BLD-SCNC: 3.9 MMOL/L (ref 3.5–5.1)
POTASSIUM BLD-SCNC: 4 MMOL/L (ref 3.5–5.1)
POTASSIUM BLD-SCNC: 4.2 MMOL/L (ref 3.5–5.1)
POTASSIUM BLD-SCNC: 5 MMOL/L (ref 3.5–5.1)
POTASSIUM BLDA-SCNC: 3.14 MMOL/L (ref 3.6–4.9)
POTASSIUM BLDA-SCNC: 3.33 MMOL/L (ref 3.6–4.9)
POTASSIUM BLDA-SCNC: 3.33 MMOL/L (ref 3.6–4.9)
POTASSIUM BLDA-SCNC: 3.48 MMOL/L (ref 3.6–4.9)
POTASSIUM BLDA-SCNC: 3.49 MMOL/L (ref 3.6–4.9)
POTASSIUM BLDA-SCNC: 3.5 MMOL/L (ref 3.6–4.9)
POTASSIUM BLDA-SCNC: 3.53 MMOL/L (ref 3.6–4.9)
POTASSIUM BLDA-SCNC: 3.59 MMOL/L (ref 3.6–4.9)
POTASSIUM BLDA-SCNC: 3.66 MMOL/L (ref 3.6–4.9)
POTASSIUM BLDA-SCNC: 3.76 MMOL/L (ref 3.6–4.9)
POTASSIUM BLDA-SCNC: 3.91 MMOL/L (ref 3.6–4.9)
POTASSIUM BLDA-SCNC: 3.92 MMOL/L (ref 3.6–4.9)
POTASSIUM BLDA-SCNC: 3.92 MMOL/L (ref 3.6–4.9)
POTASSIUM BLDA-SCNC: 3.98 MMOL/L (ref 3.6–4.9)
POTASSIUM BLDA-SCNC: 4.04 MMOL/L (ref 3.6–4.9)
POTASSIUM BLDA-SCNC: 4.06 MMOL/L (ref 3.6–4.9)
POTASSIUM BLDA-SCNC: 4.11 MMOL/L (ref 3.6–4.9)
POTASSIUM BLDA-SCNC: 4.14 MMOL/L (ref 3.6–4.9)
PROT SERPL-MCNC: 6.2 G/DL (ref 6.3–8.6)
PROT SERPL-MCNC: 6.5 G/DL (ref 6.3–8.6)
PROT SERPL-MCNC: 6.7 G/DL (ref 6.3–8.6)
PROT SERPL-MCNC: 6.8 G/DL (ref 6.3–8.6)
PROT SERPL-MCNC: 6.9 G/DL (ref 6.3–8.6)
PROT SERPL-MCNC: 6.9 G/DL (ref 6.3–8.6)
PROT SERPL-MCNC: 7 G/DL (ref 6.3–8.6)
PROT SERPL-MCNC: 7 G/DL (ref 6.3–8.6)
PROT SERPL-MCNC: 7.1 G/DL (ref 6.3–8.6)
PROT SERPL-MCNC: 7.1 G/DL (ref 6.3–8.6)
PROT SERPL-MCNC: 7.3 G/DL (ref 6.3–8.6)
PROT SERPL-MCNC: 7.3 G/DL (ref 6.3–8.6)
PROT SERPL-MCNC: 7.7 G/DL (ref 6.3–8.6)
PROT UR QL STRIP: ABNORMAL
PTH-INTACT SERPL-MCNC: 84.1 PG/ML (ref 10–65)
RBC # BLD AUTO: 2.55 10*6/MM3 (ref 4.37–5.74)
RBC # BLD AUTO: 2.61 10*6/MM3 (ref 4.37–5.74)
RBC # BLD AUTO: 2.68 10*6/MM3 (ref 4.37–5.74)
RBC # BLD AUTO: 2.7 10*6/MM3 (ref 4.37–5.74)
RBC # BLD AUTO: 2.79 10*6/MM3 (ref 4.37–5.74)
RBC # BLD AUTO: 2.82 10*6/MM3 (ref 4.37–5.74)
RBC # BLD AUTO: 2.87 10*6/MM3 (ref 4.37–5.74)
RBC # BLD AUTO: 2.97 10*6/MM3 (ref 4.37–5.74)
RBC # BLD AUTO: 2.98 10*6/MM3 (ref 4.37–5.74)
RBC # BLD AUTO: 3 10*6/MM3 (ref 4.37–5.74)
RBC # BLD AUTO: 3 10*6/MM3 (ref 4.37–5.74)
RBC # BLD AUTO: 3.07 10*6/MM3 (ref 4.37–5.74)
RBC # BLD AUTO: 3.18 10*6/MM3 (ref 4.37–5.74)
RBC # BLD AUTO: 3.19 10*6/MM3 (ref 4.37–5.74)
RBC # BLD AUTO: 3.2 10*6/MM3 (ref 4.37–5.74)
RBC # BLD AUTO: 3.28 10*6/MM3 (ref 4.37–5.74)
RBC # BLD AUTO: 3.37 10*6/MM3 (ref 4.37–5.74)
RBC # BLD AUTO: 3.37 10*6/MM3 (ref 4.37–5.74)
RBC # UR: ABNORMAL /HPF
REF LAB TEST METHOD: ABNORMAL
RH BLD: POSITIVE
RV RNA STL NAA+PROBE: NOT DETECTED
SALMONELLA DNA SPEC QL NAA+PROBE: NOT DETECTED
SAO2 % BLDCOA: 87 % (ref 94–100)
SAO2 % BLDCOA: 92.6 % (ref 94–100)
SAO2 % BLDCOA: 92.7 % (ref 94–100)
SAO2 % BLDCOA: 93.9 % (ref 94–100)
SAO2 % BLDCOA: 94.2 % (ref 94–100)
SAO2 % BLDCOA: 94.9 %
SAO2 % BLDCOA: 95.2 % (ref 94–100)
SAO2 % BLDCOA: 95.4 %
SAO2 % BLDCOA: 96 % (ref 94–100)
SAO2 % BLDCOA: 96.3 % (ref 94–100)
SAO2 % BLDCOA: 96.7 %
SAO2 % BLDCOA: 96.7 % (ref 94–100)
SAO2 % BLDCOA: 97.2 %
SAO2 % BLDCOA: 97.2 % (ref 94–100)
SAO2 % BLDCOA: 97.4 %
SAO2 % BLDCOA: 98.1 % (ref 94–100)
SAO2 % BLDCOA: 98.6 %
SAO2 % BLDCOA: 98.8 % (ref 94–100)
SAPO I+II+IV+V RNA STL QL NAA+NON-PROBE: NOT DETECTED
SHIGELLA SP+EIEC IPAH ST NAA+NON-PROBE: NOT DETECTED
SODIUM BLD-SCNC: 139 MMOL/L (ref 137–145)
SODIUM BLD-SCNC: 139 MMOL/L (ref 137–145)
SODIUM BLD-SCNC: 140 MMOL/L (ref 137–145)
SODIUM BLD-SCNC: 141 MMOL/L (ref 137–145)
SODIUM BLD-SCNC: 141 MMOL/L (ref 137–145)
SODIUM BLD-SCNC: 142 MMOL/L (ref 137–145)
SODIUM BLD-SCNC: 142 MMOL/L (ref 137–145)
SODIUM BLD-SCNC: 143 MMOL/L (ref 137–145)
SODIUM BLD-SCNC: 144 MMOL/L (ref 137–145)
SODIUM BLD-SCNC: 144 MMOL/L (ref 137–145)
SODIUM BLD-SCNC: 145 MMOL/L (ref 137–145)
SODIUM BLD-SCNC: 147 MMOL/L (ref 137–145)
SODIUM BLD-SCNC: 149 MMOL/L (ref 137–145)
SODIUM BLDA-SCNC: 138.1 MMOL/L (ref 138–146)
SODIUM BLDA-SCNC: 138.7 MMOL/L (ref 138–146)
SODIUM BLDA-SCNC: 138.8 MMOL/L (ref 138–146)
SODIUM BLDA-SCNC: 139.7 MMOL/L (ref 138–146)
SODIUM BLDA-SCNC: 139.7 MMOL/L (ref 138–146)
SODIUM BLDA-SCNC: 140.3 MMOL/L (ref 138–146)
SODIUM BLDA-SCNC: 140.3 MMOL/L (ref 138–146)
SODIUM BLDA-SCNC: 140.7 MMOL/L (ref 138–146)
SODIUM BLDA-SCNC: 140.7 MMOL/L (ref 138–146)
SODIUM BLDA-SCNC: 140.9 MMOL/L (ref 138–146)
SODIUM BLDA-SCNC: 141 MMOL/L (ref 138–146)
SODIUM BLDA-SCNC: 141 MMOL/L (ref 138–146)
SODIUM BLDA-SCNC: 142.5 MMOL/L (ref 138–146)
SODIUM BLDA-SCNC: 142.6 MMOL/L (ref 138–146)
SODIUM BLDA-SCNC: 145 MMOL/L (ref 138–146)
SODIUM BLDA-SCNC: 145.9 MMOL/L (ref 138–146)
SODIUM BLDA-SCNC: 147.3 MMOL/L (ref 138–146)
SODIUM BLDA-SCNC: 149.4 MMOL/L (ref 138–146)
SODIUM UR-SCNC: 41 MMOL/L (ref 30–90)
SP GR UR STRIP: 1.02 (ref 1–1.03)
SQUAMOUS #/AREA URNS HPF: ABNORMAL /HPF
TIBC SERPL-MCNC: 241 MCG/DL (ref 261–462)
UNIT  ABO: NORMAL
UNIT  ABO: NORMAL
UNIT  RH: NORMAL
UNIT  RH: NORMAL
UROBILINOGEN UR QL STRIP: ABNORMAL
V CHOLERAE DNA SPEC QL NAA+PROBE: NOT DETECTED
VIBRIO DNA SPEC NAA+PROBE: NOT DETECTED
WBC NRBC COR # BLD: 10.15 10*3/MM3 (ref 3.2–9.8)
WBC NRBC COR # BLD: 10.34 10*3/MM3 (ref 3.2–9.8)
WBC NRBC COR # BLD: 10.64 10*3/MM3 (ref 3.2–9.8)
WBC NRBC COR # BLD: 10.75 10*3/MM3 (ref 3.2–9.8)
WBC NRBC COR # BLD: 10.86 10*3/MM3 (ref 3.2–9.8)
WBC NRBC COR # BLD: 11.55 10*3/MM3 (ref 3.2–9.8)
WBC NRBC COR # BLD: 11.59 10*3/MM3 (ref 3.2–9.8)
WBC NRBC COR # BLD: 12.02 10*3/MM3 (ref 3.2–9.8)
WBC NRBC COR # BLD: 13.23 10*3/MM3 (ref 3.2–9.8)
WBC NRBC COR # BLD: 14.5 10*3/MM3 (ref 3.2–9.8)
WBC NRBC COR # BLD: 16.05 10*3/MM3 (ref 3.2–9.8)
WBC NRBC COR # BLD: 19.2 10*3/MM3 (ref 3.2–9.8)
WBC NRBC COR # BLD: 19.37 10*3/MM3 (ref 3.2–9.8)
WBC NRBC COR # BLD: 20.03 10*3/MM3 (ref 3.2–9.8)
WBC NRBC COR # BLD: 23.28 10*3/MM3 (ref 3.2–9.8)
WBC NRBC COR # BLD: 26.49 10*3/MM3 (ref 3.2–9.8)
WBC NRBC COR # BLD: 9.29 10*3/MM3 (ref 3.2–9.8)
WBC NRBC COR # BLD: 9.45 10*3/MM3 (ref 3.2–9.8)
WBC UR QL AUTO: ABNORMAL /HPF
YERSINIA STL CULT: NOT DETECTED

## 2018-01-01 PROCEDURE — 85027 COMPLETE CBC AUTOMATED: CPT | Performed by: INTERNAL MEDICINE

## 2018-01-01 PROCEDURE — 82962 GLUCOSE BLOOD TEST: CPT

## 2018-01-01 PROCEDURE — 25010000002 PROPOFOL 1000 MG/ML EMULSION: Performed by: NURSE PRACTITIONER

## 2018-01-01 PROCEDURE — 83735 ASSAY OF MAGNESIUM: CPT | Performed by: INTERNAL MEDICINE

## 2018-01-01 PROCEDURE — 25010000002 HEPARIN (PORCINE) PER 1000 UNITS: Performed by: NURSE PRACTITIONER

## 2018-01-01 PROCEDURE — 82306 VITAMIN D 25 HYDROXY: CPT | Performed by: INTERNAL MEDICINE

## 2018-01-01 PROCEDURE — 63710000001 INSULIN DETEMIR PER 5 UNITS: Performed by: NURSE PRACTITIONER

## 2018-01-01 PROCEDURE — 25010000002 FLUCONAZOLE PER 200 MG: Performed by: NURSE PRACTITIONER

## 2018-01-01 PROCEDURE — 74018 RADEX ABDOMEN 1 VIEW: CPT

## 2018-01-01 PROCEDURE — 84300 ASSAY OF URINE SODIUM: CPT | Performed by: INTERNAL MEDICINE

## 2018-01-01 PROCEDURE — 82803 BLOOD GASES ANY COMBINATION: CPT | Performed by: INTERNAL MEDICINE

## 2018-01-01 PROCEDURE — 87147 CULTURE TYPE IMMUNOLOGIC: CPT | Performed by: INTERNAL MEDICINE

## 2018-01-01 PROCEDURE — 94003 VENT MGMT INPAT SUBQ DAY: CPT | Performed by: INTERNAL MEDICINE

## 2018-01-01 PROCEDURE — 25010000002 LEVOFLOXACIN PER 250 MG: Performed by: INTERNAL MEDICINE

## 2018-01-01 PROCEDURE — P9041 ALBUMIN (HUMAN),5%, 50ML: HCPCS | Performed by: INTERNAL MEDICINE

## 2018-01-01 PROCEDURE — 25010000002 HEPARIN LOCK FLUSH 10 UNIT/ML SOLUTION: Performed by: NURSE PRACTITIONER

## 2018-01-01 PROCEDURE — 25010000002 HYDRALAZINE PER 20 MG: Performed by: NURSE PRACTITIONER

## 2018-01-01 PROCEDURE — C1752 CATH,HEMODIALYSIS,SHORT-TERM: HCPCS

## 2018-01-01 PROCEDURE — 87040 BLOOD CULTURE FOR BACTERIA: CPT | Performed by: INTERNAL MEDICINE

## 2018-01-01 PROCEDURE — 63710000001 INSULIN ASPART PER 5 UNITS: Performed by: NURSE PRACTITIONER

## 2018-01-01 PROCEDURE — P9016 RBC LEUKOCYTES REDUCED: HCPCS

## 2018-01-01 PROCEDURE — 80053 COMPREHEN METABOLIC PANEL: CPT | Performed by: INTERNAL MEDICINE

## 2018-01-01 PROCEDURE — 71045 X-RAY EXAM CHEST 1 VIEW: CPT

## 2018-01-01 PROCEDURE — 99219 PR INITIAL OBSERVATION CARE/DAY 50 MINUTES: CPT | Performed by: SURGERY

## 2018-01-01 PROCEDURE — 74022 RADEX COMPL AQT ABD SERIES: CPT

## 2018-01-01 PROCEDURE — 25010000002 PIPERACILLIN SOD-TAZOBACTAM PER 1 G: Performed by: INTERNAL MEDICINE

## 2018-01-01 PROCEDURE — 25010000002 LEVOFLOXACIN PER 250 MG: Performed by: NURSE PRACTITIONER

## 2018-01-01 PROCEDURE — 25010000002 PIPERACILLIN SOD-TAZOBACTAM PER 1 G: Performed by: NURSE PRACTITIONER

## 2018-01-01 PROCEDURE — 87493 C DIFF AMPLIFIED PROBE: CPT | Performed by: INTERNAL MEDICINE

## 2018-01-01 PROCEDURE — 83605 ASSAY OF LACTIC ACID: CPT | Performed by: INTERNAL MEDICINE

## 2018-01-01 PROCEDURE — 87070 CULTURE OTHR SPECIMN AEROBIC: CPT | Performed by: INTERNAL MEDICINE

## 2018-01-01 PROCEDURE — 25010000002 MORPHINE PER 10 MG: Performed by: NURSE PRACTITIONER

## 2018-01-01 PROCEDURE — 86900 BLOOD TYPING SEROLOGIC ABO: CPT | Performed by: INTERNAL MEDICINE

## 2018-01-01 PROCEDURE — 81001 URINALYSIS AUTO W/SCOPE: CPT | Performed by: INTERNAL MEDICINE

## 2018-01-01 PROCEDURE — 86900 BLOOD TYPING SEROLOGIC ABO: CPT

## 2018-01-01 PROCEDURE — 87205 SMEAR GRAM STAIN: CPT | Performed by: INTERNAL MEDICINE

## 2018-01-01 PROCEDURE — 87340 HEPATITIS B SURFACE AG IA: CPT | Performed by: INTERNAL MEDICINE

## 2018-01-01 PROCEDURE — 25010000002 LORAZEPAM PER 2 MG: Performed by: INTERNAL MEDICINE

## 2018-01-01 PROCEDURE — 87150 DNA/RNA AMPLIFIED PROBE: CPT | Performed by: INTERNAL MEDICINE

## 2018-01-01 PROCEDURE — 87086 URINE CULTURE/COLONY COUNT: CPT | Performed by: INTERNAL MEDICINE

## 2018-01-01 PROCEDURE — 86850 RBC ANTIBODY SCREEN: CPT | Performed by: INTERNAL MEDICINE

## 2018-01-01 PROCEDURE — 25010000002 LORAZEPAM PER 2 MG: Performed by: NURSE PRACTITIONER

## 2018-01-01 PROCEDURE — 83550 IRON BINDING TEST: CPT | Performed by: INTERNAL MEDICINE

## 2018-01-01 PROCEDURE — 82728 ASSAY OF FERRITIN: CPT | Performed by: INTERNAL MEDICINE

## 2018-01-01 PROCEDURE — 93971 EXTREMITY STUDY: CPT

## 2018-01-01 PROCEDURE — 84132 ASSAY OF SERUM POTASSIUM: CPT | Performed by: INTERNAL MEDICINE

## 2018-01-01 PROCEDURE — 36556 INSERT NON-TUNNEL CV CATH: CPT | Performed by: THORACIC SURGERY (CARDIOTHORACIC VASCULAR SURGERY)

## 2018-01-01 PROCEDURE — 74176 CT ABD & PELVIS W/O CONTRAST: CPT

## 2018-01-01 PROCEDURE — 85025 COMPLETE CBC W/AUTO DIFF WBC: CPT | Performed by: INTERNAL MEDICINE

## 2018-01-01 PROCEDURE — 87507 IADNA-DNA/RNA PROBE TQ 12-25: CPT | Performed by: NURSE PRACTITIONER

## 2018-01-01 PROCEDURE — 63510000001 EPOETIN ALFA PER 1000 UNITS: Performed by: INTERNAL MEDICINE

## 2018-01-01 PROCEDURE — 25010000002 FUROSEMIDE PER 20 MG: Performed by: INTERNAL MEDICINE

## 2018-01-01 PROCEDURE — 83540 ASSAY OF IRON: CPT | Performed by: INTERNAL MEDICINE

## 2018-01-01 PROCEDURE — 83970 ASSAY OF PARATHORMONE: CPT | Performed by: INTERNAL MEDICINE

## 2018-01-01 PROCEDURE — 93010 ELECTROCARDIOGRAM REPORT: CPT | Performed by: INTERNAL MEDICINE

## 2018-01-01 PROCEDURE — 86923 COMPATIBILITY TEST ELECTRIC: CPT

## 2018-01-01 PROCEDURE — 86901 BLOOD TYPING SEROLOGIC RH(D): CPT

## 2018-01-01 PROCEDURE — 25010000002 ALBUMIN HUMAN 5% PER 50 ML: Performed by: INTERNAL MEDICINE

## 2018-01-01 PROCEDURE — 86901 BLOOD TYPING SEROLOGIC RH(D): CPT | Performed by: INTERNAL MEDICINE

## 2018-01-01 PROCEDURE — 83631 LACTOFERRIN FECAL (QUANT): CPT | Performed by: NURSE PRACTITIONER

## 2018-01-01 PROCEDURE — 87804 INFLUENZA ASSAY W/OPTIC: CPT | Performed by: INTERNAL MEDICINE

## 2018-01-01 PROCEDURE — 80048 BASIC METABOLIC PNL TOTAL CA: CPT | Performed by: INTERNAL MEDICINE

## 2018-01-01 PROCEDURE — 93005 ELECTROCARDIOGRAM TRACING: CPT | Performed by: INTERNAL MEDICINE

## 2018-01-01 RX ORDER — POTASSIUM CHLORIDE 750 MG/1
40 CAPSULE, EXTENDED RELEASE ORAL
Status: DISPENSED | OUTPATIENT
Start: 2018-01-01 | End: 2018-01-01

## 2018-01-01 RX ORDER — DEXTROSE MONOHYDRATE 50 MG/ML
60 INJECTION, SOLUTION INTRAVENOUS CONTINUOUS
Status: DISCONTINUED | OUTPATIENT
Start: 2018-01-01 | End: 2018-01-01 | Stop reason: ALTCHOICE

## 2018-01-01 RX ORDER — ALBUMIN, HUMAN INJ 5% 5 %
500 SOLUTION INTRAVENOUS ONCE
Status: DISCONTINUED | OUTPATIENT
Start: 2018-01-01 | End: 2018-01-01

## 2018-01-01 RX ORDER — LEVOFLOXACIN 5 MG/ML
250 INJECTION, SOLUTION INTRAVENOUS EVERY 24 HOURS
Status: DISCONTINUED | OUTPATIENT
Start: 2018-01-01 | End: 2018-01-01

## 2018-01-01 RX ORDER — SODIUM CHLORIDE 9 MG/ML
INJECTION, SOLUTION INTRAVENOUS
Status: DISPENSED
Start: 2018-01-01 | End: 2018-01-01

## 2018-01-01 RX ORDER — CARVEDILOL 12.5 MG/1
12.5 TABLET ORAL EVERY 12 HOURS SCHEDULED
Status: DISCONTINUED | OUTPATIENT
Start: 2018-01-01 | End: 2018-01-01

## 2018-01-01 RX ORDER — SIMETHICONE 80 MG
80 TABLET,CHEWABLE ORAL EVERY 6 HOURS SCHEDULED
Status: DISCONTINUED | OUTPATIENT
Start: 2018-01-01 | End: 2018-01-01

## 2018-01-01 RX ORDER — FERROUS SULFATE 220 (44)/5
300 ELIXIR ORAL DAILY
Status: DISCONTINUED | OUTPATIENT
Start: 2018-01-01 | End: 2018-01-01

## 2018-01-01 RX ORDER — FERROUS SULFATE 220 (44)/5
220 ELIXIR ORAL 2 TIMES DAILY
Status: DISCONTINUED | OUTPATIENT
Start: 2018-01-01 | End: 2018-01-01

## 2018-01-01 RX ORDER — SODIUM CHLORIDE 0.9 % (FLUSH) 0.9 %
10 SYRINGE (ML) INJECTION EVERY 8 HOURS SCHEDULED
Status: DISCONTINUED | OUTPATIENT
Start: 2018-01-01 | End: 2018-01-01

## 2018-01-01 RX ORDER — IPRATROPIUM BROMIDE AND ALBUTEROL SULFATE 2.5; .5 MG/3ML; MG/3ML
3 SOLUTION RESPIRATORY (INHALATION)
Status: DISCONTINUED | OUTPATIENT
Start: 2018-01-01 | End: 2018-01-01

## 2018-01-01 RX ORDER — SODIUM BICARBONATE 650 MG/1
650 TABLET ORAL 2 TIMES DAILY
Status: DISCONTINUED | OUTPATIENT
Start: 2018-01-01 | End: 2018-01-01

## 2018-01-01 RX ORDER — POTASSIUM CHLORIDE 1.5 G/1.77G
40 POWDER, FOR SOLUTION ORAL
Status: DISPENSED | OUTPATIENT
Start: 2018-01-01 | End: 2018-01-01

## 2018-01-01 RX ORDER — HYDROCODONE BITARTRATE AND ACETAMINOPHEN 5; 325 MG/1; MG/1
1 TABLET ORAL EVERY 4 HOURS PRN
Status: DISPENSED | OUTPATIENT
Start: 2018-01-01 | End: 2018-01-01

## 2018-01-01 RX ORDER — SODIUM BICARBONATE 325 MG/1
325 TABLET ORAL 2 TIMES DAILY
Status: DISCONTINUED | OUTPATIENT
Start: 2018-01-01 | End: 2018-01-01

## 2018-01-01 RX ORDER — DOCUSATE SODIUM 50 MG/5ML
200 LIQUID ORAL 2 TIMES DAILY
Status: DISCONTINUED | OUTPATIENT
Start: 2018-01-01 | End: 2018-01-01

## 2018-01-01 RX ORDER — LORAZEPAM 2 MG/ML
1 INJECTION INTRAMUSCULAR EVERY 6 HOURS PRN
Status: DISPENSED | OUTPATIENT
Start: 2018-01-01 | End: 2018-01-01

## 2018-01-01 RX ORDER — ACETAMINOPHEN 160 MG/5ML
650 SOLUTION ORAL EVERY 4 HOURS PRN
Status: DISCONTINUED | OUTPATIENT
Start: 2018-01-01 | End: 2018-01-01

## 2018-01-01 RX ORDER — HEPARIN SODIUM,PORCINE 10 UNIT/ML
30 VIAL (ML) INTRAVENOUS EVERY 8 HOURS SCHEDULED
Status: DISCONTINUED | OUTPATIENT
Start: 2018-01-01 | End: 2018-01-01

## 2018-01-01 RX ORDER — FUROSEMIDE 10 MG/ML
20 INJECTION INTRAMUSCULAR; INTRAVENOUS ONCE
Status: DISCONTINUED | OUTPATIENT
Start: 2018-01-01 | End: 2018-01-01

## 2018-01-01 RX ORDER — LEVOTHYROXINE SODIUM 0.03 MG/1
25 TABLET ORAL
Status: DISCONTINUED | OUTPATIENT
Start: 2018-01-01 | End: 2018-01-01

## 2018-01-01 RX ORDER — PANTOPRAZOLE SODIUM 40 MG/1
40 GRANULE, DELAYED RELEASE ORAL
Status: DISCONTINUED | OUTPATIENT
Start: 2018-01-01 | End: 2018-01-01

## 2018-01-01 RX ORDER — FEBUXOSTAT 40 MG/1
40 TABLET, FILM COATED ORAL DAILY
Status: DISCONTINUED | OUTPATIENT
Start: 2018-01-01 | End: 2018-01-01

## 2018-01-01 RX ORDER — PANTOPRAZOLE SODIUM 40 MG/1
40 TABLET, DELAYED RELEASE ORAL
Status: DISCONTINUED | OUTPATIENT
Start: 2018-01-01 | End: 2018-01-01

## 2018-01-01 RX ORDER — LEVOFLOXACIN 5 MG/ML
750 INJECTION, SOLUTION INTRAVENOUS
Status: DISPENSED | OUTPATIENT
Start: 2018-01-01 | End: 2018-01-01

## 2018-01-01 RX ORDER — GABAPENTIN 100 MG/1
100 CAPSULE ORAL NIGHTLY
Status: DISCONTINUED | OUTPATIENT
Start: 2018-01-01 | End: 2018-01-01

## 2018-01-01 RX ORDER — FLUCONAZOLE 2 MG/ML
200 INJECTION, SOLUTION INTRAVENOUS DAILY
Status: DISCONTINUED | OUTPATIENT
Start: 2018-01-01 | End: 2018-01-01

## 2018-01-01 RX ORDER — TAMSULOSIN HYDROCHLORIDE 0.4 MG/1
0.4 CAPSULE ORAL NIGHTLY
Status: DISCONTINUED | OUTPATIENT
Start: 2018-01-01 | End: 2018-01-01

## 2018-01-01 RX ORDER — PANTOPRAZOLE SODIUM 40 MG/10ML
40 INJECTION, POWDER, LYOPHILIZED, FOR SOLUTION INTRAVENOUS
Status: DISCONTINUED | OUTPATIENT
Start: 2018-01-01 | End: 2018-01-01

## 2018-01-01 RX ORDER — LORAZEPAM 2 MG/ML
1 INJECTION INTRAMUSCULAR
Status: DISCONTINUED | OUTPATIENT
Start: 2018-01-01 | End: 2018-01-21 | Stop reason: HOSPADM

## 2018-01-01 RX ORDER — HYDRALAZINE HYDROCHLORIDE 20 MG/ML
10 INJECTION INTRAMUSCULAR; INTRAVENOUS EVERY 6 HOURS PRN
Status: DISCONTINUED | OUTPATIENT
Start: 2018-01-01 | End: 2018-01-01

## 2018-01-01 RX ORDER — ONDANSETRON 2 MG/ML
4 INJECTION INTRAMUSCULAR; INTRAVENOUS EVERY 4 HOURS PRN
Status: DISCONTINUED | OUTPATIENT
Start: 2018-01-01 | End: 2018-01-01

## 2018-01-01 RX ORDER — HEPARIN SODIUM 5000 [USP'U]/ML
5000 INJECTION, SOLUTION INTRAVENOUS; SUBCUTANEOUS EVERY 8 HOURS SCHEDULED
Status: DISCONTINUED | OUTPATIENT
Start: 2018-01-01 | End: 2018-01-01

## 2018-01-01 RX ORDER — SODIUM BICARBONATE 650 MG/1
1300 TABLET ORAL 2 TIMES DAILY
Status: DISCONTINUED | OUTPATIENT
Start: 2018-01-01 | End: 2018-01-01

## 2018-01-01 RX ORDER — AMLODIPINE BESYLATE 10 MG/1
10 TABLET ORAL
Status: DISCONTINUED | OUTPATIENT
Start: 2018-01-01 | End: 2018-01-01 | Stop reason: ALTCHOICE

## 2018-01-01 RX ORDER — HYDROCODONE BITARTRATE AND ACETAMINOPHEN 5; 325 MG/1; MG/1
1 TABLET ORAL EVERY 4 HOURS PRN
Status: DISCONTINUED | OUTPATIENT
Start: 2018-01-01 | End: 2018-01-01

## 2018-01-01 RX ORDER — AMITRIPTYLINE HYDROCHLORIDE 25 MG/1
25 TABLET, FILM COATED ORAL EVERY 12 HOURS SCHEDULED
Status: DISCONTINUED | OUTPATIENT
Start: 2018-01-01 | End: 2018-01-01

## 2018-01-01 RX ORDER — HYDRALAZINE HYDROCHLORIDE 50 MG/1
100 TABLET, FILM COATED ORAL EVERY 8 HOURS SCHEDULED
Status: DISCONTINUED | OUTPATIENT
Start: 2018-01-01 | End: 2018-01-01

## 2018-01-01 RX ORDER — HYDRALAZINE HYDROCHLORIDE 50 MG/1
50 TABLET, FILM COATED ORAL EVERY 8 HOURS SCHEDULED
Status: DISCONTINUED | OUTPATIENT
Start: 2018-01-01 | End: 2018-01-01

## 2018-01-01 RX ORDER — ASPIRIN 325 MG
325 TABLET ORAL DAILY
Status: DISCONTINUED | OUTPATIENT
Start: 2018-01-01 | End: 2018-01-01

## 2018-01-01 RX ORDER — SODIUM BICARBONATE 650 MG/1
1300 TABLET ORAL 3 TIMES DAILY
Status: DISCONTINUED | OUTPATIENT
Start: 2018-01-01 | End: 2018-01-01

## 2018-01-01 RX ORDER — LACTULOSE 10 G/15ML
30 SOLUTION ORAL DAILY
Status: DISCONTINUED | OUTPATIENT
Start: 2018-01-01 | End: 2018-01-01

## 2018-01-01 RX ORDER — BISACODYL 10 MG
10 SUPPOSITORY, RECTAL RECTAL DAILY PRN
Status: DISCONTINUED | OUTPATIENT
Start: 2018-01-01 | End: 2018-01-01

## 2018-01-01 RX ORDER — BENZONATATE 100 MG/1
100 CAPSULE ORAL 3 TIMES DAILY PRN
Status: DISCONTINUED | OUTPATIENT
Start: 2018-01-01 | End: 2018-01-01

## 2018-01-01 RX ORDER — METOPROLOL TARTRATE 50 MG/1
50 TABLET, FILM COATED ORAL DAILY
Status: DISCONTINUED | OUTPATIENT
Start: 2018-01-01 | End: 2018-01-01

## 2018-01-01 RX ORDER — LORAZEPAM 2 MG/ML
1 INJECTION INTRAMUSCULAR EVERY 6 HOURS PRN
Status: DISCONTINUED | OUTPATIENT
Start: 2018-01-01 | End: 2018-01-01

## 2018-01-04 NOTE — SIGNIFICANT NOTE
01/04/18 1056   Rehab Treatment   Discipline physical therapist   Rehab Evaluation   Evaluation Not Performed patient unavailable for evaluation  (Pt vented and sedated with no commands following. Per RN, unable to wean sedation at this time. Will check on patient Monday)   Recommendation   PT - Next Appointment 01/08/18

## 2018-01-08 NOTE — SIGNIFICANT NOTE
01/08/18 1024   Rehab Treatment   Discipline physical therapist   Rehab Evaluation   Evaluation Not Performed other (see comments)  (Pt not appropriate for skilled PT at this time. Re-order PT when medically ready)

## 2018-01-20 NOTE — CONSULTS
Referring Provider:  Dr Greer       Patient Care Team:  Luis Alberto Reese MD as PCP - General (Pulmonary Disease)    Ischemic bowel    Subjective .     History of present illness:   Asked to see this gentleman on the LTAC who has been on a ventilator at her LTAC for at least 2 and half weeks with a trach in place and a PEG which was done in [place].  Patient's been difficult to get off the ventilator and a few days ago started having abdominal distention and worsening of his renal function and not tolerating his PEG tube feedings.  Had an elevated white count and was treated with IV antibiotics saw nephrology yesterday and was started on hemodialysis today after placement of a dialysis catheter this morning.  Patient was started on Levophed yesterday and remains only if it currently, patient was sent for a CT scan and it demonstrates pneumatosis in the cecum and ascending colon and portions of the left colon  and likely persistent right lower lobe pneumonia.  Patient has been hypotensive significantly over the past few days per nursing report.   Patient is not able to provide any history before meals sedated on the ventilator currently    Review of Systems  not available to be obtained as patient is intubated and sedated.  Reviewing his H&P is had an open cholecystectomy but no other abdominal procedures       History  No past medical history on file., No past surgical history on file., No family history on file., Social History   Substance Use Topics   • Smoking status: Not on file   • Smokeless tobacco: Not on file   • Alcohol use Not on file   , Home Medications:  Prior to Admission medications    Not on File   , Scheduled Meds:    albumin human 500 mL Intravenous Once   amitriptyline 25 mg Nasogastric Q12H   aspirin 325 mg Nasogastric Daily   Docusate Sodium 200 mg Nasogastric BID   febuxostat 40 mg Nasogastric Daily   heparin (porcine) 5,000 Units Subcutaneous Q8H   heparin lock flush 30 Units  Intracatheter Q8H   insulin aspart 2-12 Units Subcutaneous 4x Daily AC & at Bedtime   insulin detemir 40 Units Subcutaneous Nightly   ipratropium-albuterol 3 mL Nebulization Q4H - RT   lactulose 30 g Nasogastric Daily   levoFLOXacin 250 mg Intravenous Q24H   levothyroxine 25 mcg Nasogastric Q AM   magic butt ointment  Topical BID   pantoprazole 40 mg Intravenous Q AM   piperacillin-tazobactam 2.25 g Intravenous Q8H   simethicone 80 mg Per G Tube Q6H   sodium chloride 0.9 % flush 10 mL Intravenous Q8H   , Continuous Infusions:    norepinephrine 0.02-0.3 mcg/kg/min   propofol 5-50 mcg/kg/min   sodium bicarbonate 150 mEq   , PRN Meds:  •  acetaminophen  •  benzonatate  •  bisacodyl  •  hydrALAZINE  •  HYDROcodone-acetaminophen  •  ipratropium-albuterol  •  LORazepam  •  magic butt ointment  •  metoprolol tartrate  •  ondansetron and Allergies:  No Known Allergies    Objective     Vital Signs   Heart Rate:  [] 99    Physical Exam:     General Appearance:  Sedated resting comfortably    Head:    Normocephalic, without obvious abnormality, atraumatic   Eyes:            Lids and lashes normal, conjunctivae and sclerae normal, no   icterus, no pallor, corneas clear   Ears:    Ears appear intact with no abnormalities noted   Throat: Trach in place         Lungs:     Clear to auscultation,respirations regular, even and                  unlabored    Heart:    Regular rhythm and normal rate, normal S1 and S2, no            murmur, no gallop, no rub, no click   Chest Wall:    No abnormalities observed   Abdomen:   Abdomen distended midline incision well-healed above the umbilicus without any obvious hernias         Skin:   No bleeding, bruising or rash        Neurologic: Resting comfortably in bed not responding to verbal stimulation       Results Review: Reviewed CT scan with Dr. Garsia.  Patient has elevated white count and has a CO2 of 12.  Lactic acid yesterday was normal at 0.9        Assessment/Plan   Worsening  overall medical state.  Suspect the pneumatosis suggesting ischemia to his colon is results of his recent hypotension and in my opinion most likely unable to be reversed.  I discussed with the patient's daughter Maria E and explained to her overall situation with her father.  If he gets sicker her becomes see perforated the one option would be to operate on him and likely remove his colon and possibly other portions of bowel that may be affected.  More than likely he would not recover from that operation.  She understands and wishes to come and see him later this afternoon and make a decision about his overall care at that time.  I'm available if needed further               This document has been electronically signed by Alexandre Young MD on January 20, 2018 2:30 PM     Alexandre Young MD  01/20/18  2:30 PM

## 2018-01-21 LAB
BACTERIA SPEC RESP CULT: NORMAL
BACTERIA SPEC RESP CULT: NORMAL
GRAM STN SPEC: NORMAL

## 2018-01-24 LAB
BACTERIA SPEC AEROBE CULT: NORMAL
HBV SURFACE AG SERPL QL IA: NEGATIVE